# Patient Record
Sex: FEMALE | Race: OTHER | HISPANIC OR LATINO
[De-identification: names, ages, dates, MRNs, and addresses within clinical notes are randomized per-mention and may not be internally consistent; named-entity substitution may affect disease eponyms.]

---
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
## 2019-10-31 ENCOUNTER — APPOINTMENT (OUTPATIENT)
Dept: OBGYN | Facility: CLINIC | Age: 19
End: 2019-10-31
Payer: COMMERCIAL

## 2019-10-31 VITALS
BODY MASS INDEX: 26.12 KG/M2 | SYSTOLIC BLOOD PRESSURE: 110 MMHG | HEIGHT: 63.5 IN | DIASTOLIC BLOOD PRESSURE: 60 MMHG | WEIGHT: 149.25 LBS

## 2019-10-31 DIAGNOSIS — Z01.419 ENCOUNTER FOR GYNECOLOGICAL EXAMINATION (GENERAL) (ROUTINE) W/OUT ABNORMAL FINDINGS: ICD-10-CM

## 2019-10-31 LAB
HCG UR QL: POSITIVE
QUALITY CONTROL: YES

## 2019-10-31 PROCEDURE — 99395 PREV VISIT EST AGE 18-39: CPT

## 2019-10-31 NOTE — HISTORY OF PRESENT ILLNESS
[Normal Amount/Duration] : was of a normal amount and duration [Frequency: Q ___ days] : menstrual periods occur approximately every [unfilled] days [Menarche Age: ____] : age at menarche was [unfilled] [Sexually Active] : is sexually active [Spontaneous/Secondary] : is secondary/spontaneous [Pregnancy] : pregnant [Spotting Between  Menses] : no spotting between menses [Contraception] : does not use contraception

## 2019-11-01 LAB — HCG SERPL-MCNC: 7145 MIU/ML

## 2019-11-04 ENCOUNTER — RESULT REVIEW (OUTPATIENT)
Age: 19
End: 2019-11-04

## 2019-11-04 LAB
C TRACH RRNA SPEC QL NAA+PROBE: DETECTED
HCG SERPL-MCNC: 6334 MIU/ML
N GONORRHOEA RRNA SPEC QL NAA+PROBE: NOT DETECTED
SOURCE AMPLIFICATION: NORMAL

## 2019-11-05 ENCOUNTER — APPOINTMENT (OUTPATIENT)
Dept: ANTEPARTUM | Facility: CLINIC | Age: 19
End: 2019-11-05
Payer: COMMERCIAL

## 2019-11-05 ENCOUNTER — ASOB RESULT (OUTPATIENT)
Age: 19
End: 2019-11-05

## 2019-11-05 ENCOUNTER — APPOINTMENT (OUTPATIENT)
Dept: OBGYN | Facility: CLINIC | Age: 19
End: 2019-11-05
Payer: COMMERCIAL

## 2019-11-05 VITALS
DIASTOLIC BLOOD PRESSURE: 70 MMHG | HEIGHT: 63 IN | BODY MASS INDEX: 26.41 KG/M2 | SYSTOLIC BLOOD PRESSURE: 102 MMHG | WEIGHT: 149.06 LBS

## 2019-11-05 PROCEDURE — 99213 OFFICE O/P EST LOW 20 MIN: CPT

## 2019-11-05 PROCEDURE — 76817 TRANSVAGINAL US OBSTETRIC: CPT

## 2019-11-06 LAB
ABO + RH PNL BLD: NORMAL
BLD GP AB SCN SERPL QL: NORMAL
HCG SERPL-MCNC: 6126 MIU/ML

## 2019-11-07 ENCOUNTER — APPOINTMENT (OUTPATIENT)
Dept: OBGYN | Facility: CLINIC | Age: 19
End: 2019-11-07
Payer: COMMERCIAL

## 2019-11-07 PROCEDURE — 36415 COLL VENOUS BLD VENIPUNCTURE: CPT

## 2019-11-08 LAB — HCG SERPL-MCNC: 5049 MIU/ML

## 2019-11-14 ENCOUNTER — ASOB RESULT (OUTPATIENT)
Age: 19
End: 2019-11-14

## 2019-11-14 ENCOUNTER — TRANSCRIPTION ENCOUNTER (OUTPATIENT)
Age: 19
End: 2019-11-14

## 2019-11-14 ENCOUNTER — APPOINTMENT (OUTPATIENT)
Dept: OBGYN | Facility: CLINIC | Age: 19
End: 2019-11-14
Payer: COMMERCIAL

## 2019-11-14 ENCOUNTER — APPOINTMENT (OUTPATIENT)
Dept: ANTEPARTUM | Facility: CLINIC | Age: 19
End: 2019-11-14
Payer: COMMERCIAL

## 2019-11-14 VITALS
WEIGHT: 149 LBS | SYSTOLIC BLOOD PRESSURE: 100 MMHG | HEIGHT: 63 IN | DIASTOLIC BLOOD PRESSURE: 60 MMHG | BODY MASS INDEX: 26.4 KG/M2

## 2019-11-14 PROCEDURE — 76817 TRANSVAGINAL US OBSTETRIC: CPT

## 2019-11-14 PROCEDURE — 99214 OFFICE O/P EST MOD 30 MIN: CPT

## 2019-11-14 NOTE — PHYSICAL EXAM
[Normal] : cervix [Dilated] : the cervix was not dilated [Uterine Adnexae] : were not tender and not enlarged [No Bleeding] : there was no active vaginal bleeding

## 2019-11-15 ENCOUNTER — OUTPATIENT (OUTPATIENT)
Dept: OUTPATIENT SERVICES | Facility: HOSPITAL | Age: 19
LOS: 1 days | End: 2019-11-15
Payer: COMMERCIAL

## 2019-11-15 ENCOUNTER — RESULT REVIEW (OUTPATIENT)
Age: 19
End: 2019-11-15

## 2019-11-15 ENCOUNTER — APPOINTMENT (OUTPATIENT)
Dept: OBGYN | Facility: HOSPITAL | Age: 19
End: 2019-11-15

## 2019-11-15 VITALS
RESPIRATION RATE: 16 BRPM | DIASTOLIC BLOOD PRESSURE: 70 MMHG | OXYGEN SATURATION: 100 % | HEART RATE: 69 BPM | SYSTOLIC BLOOD PRESSURE: 115 MMHG

## 2019-11-15 VITALS
WEIGHT: 147.27 LBS | DIASTOLIC BLOOD PRESSURE: 70 MMHG | HEIGHT: 63 IN | TEMPERATURE: 97 F | HEART RATE: 75 BPM | RESPIRATION RATE: 16 BRPM | SYSTOLIC BLOOD PRESSURE: 110 MMHG

## 2019-11-15 VITALS
WEIGHT: 147.27 LBS | SYSTOLIC BLOOD PRESSURE: 121 MMHG | TEMPERATURE: 100 F | OXYGEN SATURATION: 100 % | HEIGHT: 63 IN | HEART RATE: 88 BPM | RESPIRATION RATE: 16 BRPM | DIASTOLIC BLOOD PRESSURE: 63 MMHG

## 2019-11-15 DIAGNOSIS — Z01.818 ENCOUNTER FOR OTHER PREPROCEDURAL EXAMINATION: ICD-10-CM

## 2019-11-15 DIAGNOSIS — Z98.890 OTHER SPECIFIED POSTPROCEDURAL STATES: Chronic | ICD-10-CM

## 2019-11-15 DIAGNOSIS — O02.1 MISSED ABORTION: ICD-10-CM

## 2019-11-15 DIAGNOSIS — Z29.9 ENCOUNTER FOR PROPHYLACTIC MEASURES, UNSPECIFIED: ICD-10-CM

## 2019-11-15 LAB
ANION GAP SERPL CALC-SCNC: 12 MMOL/L — SIGNIFICANT CHANGE UP (ref 5–17)
BLD GP AB SCN SERPL QL: SIGNIFICANT CHANGE UP
BUN SERPL-MCNC: 7 MG/DL — LOW (ref 8–20)
CALCIUM SERPL-MCNC: 9.6 MG/DL — SIGNIFICANT CHANGE UP (ref 8.6–10.2)
CHLORIDE SERPL-SCNC: 103 MMOL/L — SIGNIFICANT CHANGE UP (ref 98–107)
CO2 SERPL-SCNC: 21 MMOL/L — LOW (ref 22–29)
CREAT SERPL-MCNC: 0.55 MG/DL — SIGNIFICANT CHANGE UP (ref 0.5–1.3)
GLUCOSE SERPL-MCNC: 86 MG/DL — SIGNIFICANT CHANGE UP (ref 70–115)
HCG SERPL-ACNC: 8053 MIU/ML — HIGH
HCT VFR BLD CALC: 37.7 % — SIGNIFICANT CHANGE UP (ref 34.5–45)
HGB BLD-MCNC: 12.5 G/DL — SIGNIFICANT CHANGE UP (ref 11.5–15.5)
MCHC RBC-ENTMCNC: 29.9 PG — SIGNIFICANT CHANGE UP (ref 27–34)
MCHC RBC-ENTMCNC: 33.2 GM/DL — SIGNIFICANT CHANGE UP (ref 32–36)
MCV RBC AUTO: 90.2 FL — SIGNIFICANT CHANGE UP (ref 80–100)
PLATELET # BLD AUTO: 330 K/UL — SIGNIFICANT CHANGE UP (ref 150–400)
POTASSIUM SERPL-MCNC: 3.8 MMOL/L — SIGNIFICANT CHANGE UP (ref 3.5–5.3)
POTASSIUM SERPL-SCNC: 3.8 MMOL/L — SIGNIFICANT CHANGE UP (ref 3.5–5.3)
RBC # BLD: 4.18 M/UL — SIGNIFICANT CHANGE UP (ref 3.8–5.2)
RBC # FLD: 13.4 % — SIGNIFICANT CHANGE UP (ref 10.3–14.5)
SODIUM SERPL-SCNC: 136 MMOL/L — SIGNIFICANT CHANGE UP (ref 135–145)
WBC # BLD: 10 K/UL — SIGNIFICANT CHANGE UP (ref 3.8–10.5)
WBC # FLD AUTO: 10 K/UL — SIGNIFICANT CHANGE UP (ref 3.8–10.5)

## 2019-11-15 PROCEDURE — 59820 CARE OF MISCARRIAGE: CPT

## 2019-11-15 PROCEDURE — 85027 COMPLETE CBC AUTOMATED: CPT

## 2019-11-15 PROCEDURE — 88305 TISSUE EXAM BY PATHOLOGIST: CPT | Mod: 26

## 2019-11-15 PROCEDURE — 36415 COLL VENOUS BLD VENIPUNCTURE: CPT

## 2019-11-15 PROCEDURE — G0463: CPT

## 2019-11-15 PROCEDURE — 84702 CHORIONIC GONADOTROPIN TEST: CPT

## 2019-11-15 PROCEDURE — 86901 BLOOD TYPING SEROLOGIC RH(D): CPT

## 2019-11-15 PROCEDURE — 88305 TISSUE EXAM BY PATHOLOGIST: CPT

## 2019-11-15 PROCEDURE — 80048 BASIC METABOLIC PNL TOTAL CA: CPT

## 2019-11-15 PROCEDURE — 86850 RBC ANTIBODY SCREEN: CPT

## 2019-11-15 PROCEDURE — 86900 BLOOD TYPING SEROLOGIC ABO: CPT

## 2019-11-15 RX ORDER — HYDROMORPHONE HYDROCHLORIDE 2 MG/ML
1 INJECTION INTRAMUSCULAR; INTRAVENOUS; SUBCUTANEOUS
Refills: 0 | Status: DISCONTINUED | OUTPATIENT
Start: 2019-11-15 | End: 2019-11-15

## 2019-11-15 RX ORDER — FENTANYL CITRATE 50 UG/ML
25 INJECTION INTRAVENOUS
Refills: 0 | Status: DISCONTINUED | OUTPATIENT
Start: 2019-11-15 | End: 2019-11-15

## 2019-11-15 RX ORDER — IBUPROFEN 200 MG
1 TABLET ORAL
Qty: 28 | Refills: 0
Start: 2019-11-15 | End: 2019-11-21

## 2019-11-15 RX ORDER — ONDANSETRON 8 MG/1
4 TABLET, FILM COATED ORAL ONCE
Refills: 0 | Status: DISCONTINUED | OUTPATIENT
Start: 2019-11-15 | End: 2019-11-15

## 2019-11-15 RX ORDER — SODIUM CHLORIDE 9 MG/ML
3 INJECTION INTRAMUSCULAR; INTRAVENOUS; SUBCUTANEOUS EVERY 8 HOURS
Refills: 0 | Status: DISCONTINUED | OUTPATIENT
Start: 2019-11-15 | End: 2019-11-30

## 2019-11-15 RX ORDER — SODIUM CHLORIDE 9 MG/ML
1000 INJECTION, SOLUTION INTRAVENOUS
Refills: 0 | Status: DISCONTINUED | OUTPATIENT
Start: 2019-11-15 | End: 2019-11-15

## 2019-11-15 RX ORDER — DEXAMETHASONE 0.5 MG/5ML
8 ELIXIR ORAL ONCE
Refills: 0 | Status: DISCONTINUED | OUTPATIENT
Start: 2019-11-15 | End: 2019-11-15

## 2019-11-15 NOTE — H&P PST ADULT - NSANTHOSAYNRD_GEN_A_CORE
No. DIONNA screening performed.  STOP BANG Legend: 0-2 = LOW Risk; 3-4 = INTERMEDIATE Risk; 5-8 = HIGH Risk

## 2019-11-15 NOTE — H&P PST ADULT - ASSESSMENT
TAII VTE 2.0 SCORE [CLOT updated 2019]    AGE RELATED RISK FACTORS                                                       MOBILITY RELATED FACTORS  [ ] Age 41-60 years                                            (1 Point)                    [ ] Bed rest                                                        (1 Point)  [ ] Age: 61-74 years                                           (2 Points)                  [ ] Plaster cast                                                   (2 Points)  [ ] Age= 75 years                                              (3 Points)                    [ ] Bed bound for more than 72 hours                 (2 Points)    DISEASE RELATED RISK FACTORS                                               GENDER SPECIFIC FACTORS  [ ] Edema in the lower extremities                       (1 Point)              [ ] Pregnancy                                                     (1 Point)  [ ] Varicose veins                                               (1 Point)                     [ ] Post-partum < 6 weeks                                   (1 Point)             [ ] BMI > 25 Kg/m2                                            (1 Point)                     [ ] Hormonal therapy  or oral contraception          (1 Point)                 [ ] Sepsis (in the previous month)                        (1 Point)               [ ] History of pregnancy complications                 (1 point)  [ ] Pneumonia or serious lung disease                                               [ ] Unexplained or recurrent                     (1 Point)           (in the previous month)                               (1 Point)  [ ] Abnormal pulmonary function test                     (1 Point)                 SURGERY RELATED RISK FACTORS  [ ] Acute myocardial infarction                              (1 Point)               [ ]  Section                                             (1 Point)  [ ] Congestive heart failure (in the previous month)  (1 Point)      [ ] Minor surgery                                                  (1 Point)   [ ] Inflammatory bowel disease                             (1 Point)               [ ] Arthroscopic surgery                                        (2 Points)  [ ] Central venous access                                      (2 Points)                [ ] General surgery lasting more than 45 minutes (2 points)  [ ] Malignancy- Present or previous                   (2 Points)                [ ] Elective arthroplasty                                         (5 points)    [ ] Stroke (in the previous month)                          (5 Points)                                                                                                                                                           HEMATOLOGY RELATED FACTORS                                                 TRAUMA RELATED RISK FACTORS  [ ] Prior episodes of VTE                                     (3 Points)                [ ] Fracture of the hip, pelvis, or leg                       (5 Points)  [ ] Positive family history for VTE                         (3 Points)             [ ] Acute spinal cord injury (in the previous month)  (5 Points)  [ ] Prothrombin 02593 A                                     (3 Points)               [ ] Paralysis  (less than 1 month)                             (5 Points)  [ ] Factor V Leiden                                             (3 Points)                  [ ] Multiple Trauma within 1 month                        (5 Points)  [ ] Lupus anticoagulants                                     (3 Points)                                                           [ ] Anticardiolipin antibodies                               (3 Points)                                                       [ ] High homocysteine in the blood                      (3 Points)                                             [ ] Other congenital or acquired thrombophilia      (3 Points)                                                [ ] Heparin induced thrombocytopenia                  (3 Points)                                     Total Score [          ] CAPRINI VTE 2.0 SCORE [CLOT updated 2019]    AGE RELATED RISK FACTORS                                                       MOBILITY RELATED FACTORS  [ ] Age 41-60 years                                            (1 Point)                    [ ] Bed rest                                                        (1 Point)  [ ] Age: 61-74 years                                           (2 Points)                  [ ] Plaster cast                                                   (2 Points)  [ ] Age= 75 years                                              (3 Points)                    [ ] Bed bound for more than 72 hours                 (2 Points)    DISEASE RELATED RISK FACTORS                                               GENDER SPECIFIC FACTORS  [ ] Edema in the lower extremities                       (1 Point)              [x ] Pregnancy                                                     (1 Point)  [ ] Varicose veins                                               (1 Point)                     [ ] Post-partum < 6 weeks                                   (1 Point)             [ x] BMI > 25 Kg/m2                                            (1 Point)                     [ ] Hormonal therapy  or oral contraception          (1 Point)                 [ ] Sepsis (in the previous month)                        (1 Point)               [ ] History of pregnancy complications                 (1 point)  [ ] Pneumonia or serious lung disease                                               [ ] Unexplained or recurrent                     (1 Point)           (in the previous month)                               (1 Point)  [ ] Abnormal pulmonary function test                     (1 Point)                 SURGERY RELATED RISK FACTORS  [ ] Acute myocardial infarction                              (1 Point)               [ ]  Section                                             (1 Point)  [ ] Congestive heart failure (in the previous month)  (1 Point)      [x ] Minor surgery                                                  (1 Point)   [ ] Inflammatory bowel disease                             (1 Point)               [ ] Arthroscopic surgery                                        (2 Points)  [ ] Central venous access                                      (2 Points)                [ ] General surgery lasting more than 45 minutes (2 points)  [ ] Malignancy- Present or previous                   (2 Points)                [ ] Elective arthroplasty                                         (5 points)    [ ] Stroke (in the previous month)                          (5 Points)                                                                                                                                                           HEMATOLOGY RELATED FACTORS                                                 TRAUMA RELATED RISK FACTORS  [ ] Prior episodes of VTE                                     (3 Points)                [ ] Fracture of the hip, pelvis, or leg                       (5 Points)  [ ] Positive family history for VTE                         (3 Points)             [ ] Acute spinal cord injury (in the previous month)  (5 Points)  [ ] Prothrombin 55049 A                                     (3 Points)               [ ] Paralysis  (less than 1 month)                             (5 Points)  [ ] Factor V Leiden                                             (3 Points)                  [ ] Multiple Trauma within 1 month                        (5 Points)  [ ] Lupus anticoagulants                                     (3 Points)                                                           [ ] Anticardiolipin antibodies                               (3 Points)                                                       [ ] High homocysteine in the blood                      (3 Points)                                             [ ] Other congenital or acquired thrombophilia      (3 Points)                                                [ ] Heparin induced thrombocytopenia                  (3 Points)                                     Total Score [  3        ]  OPIOID RISK TOOL    ZEESHAN EACH BOX THAT APPLIES AND ADD TOTALS AT THE END    FAMILY HISTORY OF SUBSTANCE ABUSE                 FEMALE         MALE                                                Alcohol                             [  ]1 pt          [  ]3pts                                               Illegal Drugs                     [  ]2 pts        [  ]3pts                                               Rx Drugs                           [  ]4 pts        [  ]4 pts    PERSONAL HISTORY OF SUBSTANCE ABUSE                                                                                          Alcohol                             [  ]3 pts       [  ]3 pts                                               Illegal Drugs                     [  ]4 pts        [  ]4 pts                                               Rx Drugs                           [  ]5 pts        [  ]5 pts    AGE BETWEEN 16-45 YEARS                                      [ x ]1 pt         [  ]1 pt    HISTORY OF PREADOLESCENT   SEXUAL ABUSE                                                             [  ]3 pts        [  ]0pts    PSYCHOLOGICAL DISEASE                     ADD, OCD, Bipolar, Schizophrenia        [  ]2 pts         [  ]2 pts                      Depression                                               [  ]1 pt           [  ]1 pt           SCORING TOTAL   (add numbers and type here)              (1 )                                     A score of 3 or lower indicated LOW risk for future opioid abuse  A score of 4 to 7 indicated moderate risk for future opioid abuse  A score of 8 or higher indicates a high risk for opioid abuse

## 2019-11-15 NOTE — H&P PST ADULT - NSICDXPASTSURGICALHX_GEN_ALL_CORE_FT
PAST SURGICAL HISTORY:  No significant past surgical history PAST SURGICAL HISTORY:  H/O ovarian cystectomy

## 2019-11-15 NOTE — ASU DISCHARGE PLAN (ADULT/PEDIATRIC) - ACTIVITY LEVEL
No douching/No intercourse/Weight bearing as tolerated/Nothing per rectum/No tampons/Nothing per vagina/No tub baths

## 2019-11-15 NOTE — BRIEF OPERATIVE NOTE - NSICDXBRIEFPROCEDURE_GEN_ALL_CORE_FT
PROCEDURES:  Dilation and curettage of uterus using suction for missed  in first trimester 15-Nov-2019 14:12:38  Ban Coreas

## 2019-11-15 NOTE — ASU PREOP CHECKLIST - TEMPERATURE IN CELSIUS (DEGREES C)
37.5 POA. Likely 2/2 hypercoagulable state in the setting of active untreated malignancy.  -c/w heparin ggt, could consider doac; with further discussion w/ surgery- not planning to continue anticoagulation on discharge. POA. Likely 2/2 hypercoagulable state in the setting of active untreated malignancy.  -c/w heparin ggt, could consider doac-- patient was on edoxaban previously; with further discussion w/ surgery- not planning to continue anticoagulation on discharge.

## 2019-11-15 NOTE — H&P PST ADULT - NSICDXPROBLEM_GEN_ALL_CORE_FT
PROBLEM DIAGNOSES  Problem: Need for prophylactic measure  Assessment and Plan: Caprini Score 3 Moderate Risk, surgical team should consider VTE prophylaxis     Problem: Missed   Assessment and Plan: D&C with suction.

## 2019-11-15 NOTE — H&P PST ADULT - HISTORY OF PRESENT ILLNESS
19 year old female 19 year old female with missed  scheduled for a D&C she states she is around 6 weeks of gestation and  started spotting last week

## 2019-11-15 NOTE — ASU DISCHARGE PLAN (ADULT/PEDIATRIC) - CARE PROVIDER_API CALL
Steven Thomson (DO)  Obstetrics and Gynecology  [address], [address] [place] [address]  Phone: [phone]  Fax: [phone]  Follow Up Time: 1 week

## 2019-11-15 NOTE — ASU DISCHARGE PLAN (ADULT/PEDIATRIC) - CALL YOUR DOCTOR IF YOU HAVE ANY OF THE FOLLOWING:
Numbness, tingling, color or temperature change to extremity/Bleeding that does not stop/Swelling that gets worse/Wound/Surgical Site with redness, or foul smelling discharge or pus/Unable to urinate

## 2019-11-15 NOTE — ASU DISCHARGE PLAN (ADULT/PEDIATRIC) - PAIN MANAGEMENT
Take over the counter pain medication Prescriptions electronically submitted to pharmacy from [facility]

## 2019-11-20 ENCOUNTER — APPOINTMENT (OUTPATIENT)
Dept: ANTEPARTUM | Facility: HOSPITAL | Age: 19
End: 2019-11-20

## 2019-11-21 LAB — SURGICAL PATHOLOGY STUDY: SIGNIFICANT CHANGE UP

## 2020-03-01 ENCOUNTER — TRANSCRIPTION ENCOUNTER (OUTPATIENT)
Age: 20
End: 2020-03-01

## 2020-06-10 ENCOUNTER — EMERGENCY (EMERGENCY)
Facility: HOSPITAL | Age: 20
LOS: 1 days | Discharge: HOME | End: 2020-06-10
Attending: EMERGENCY MEDICINE
Payer: COMMERCIAL

## 2020-06-10 VITALS
TEMPERATURE: 98 F | DIASTOLIC BLOOD PRESSURE: 85 MMHG | RESPIRATION RATE: 20 BRPM | OXYGEN SATURATION: 97 % | SYSTOLIC BLOOD PRESSURE: 123 MMHG | HEART RATE: 97 BPM

## 2020-06-10 VITALS
SYSTOLIC BLOOD PRESSURE: 110 MMHG | TEMPERATURE: 98 F | HEART RATE: 74 BPM | OXYGEN SATURATION: 99 % | RESPIRATION RATE: 18 BRPM | DIASTOLIC BLOOD PRESSURE: 62 MMHG

## 2020-06-10 DIAGNOSIS — Z98.890 OTHER SPECIFIED POSTPROCEDURAL STATES: Chronic | ICD-10-CM

## 2020-06-10 DIAGNOSIS — F32.9 MAJOR DEPRESSIVE DISORDER, SINGLE EPISODE, UNSPECIFIED: ICD-10-CM

## 2020-06-10 LAB
AMPHET UR-MCNC: NEGATIVE — SIGNIFICANT CHANGE UP
APAP SERPL-MCNC: <7.5 UG/ML — LOW (ref 10–26)
BARBITURATES UR SCN-MCNC: NEGATIVE — SIGNIFICANT CHANGE UP
BASOPHILS # BLD AUTO: 0.05 K/UL — SIGNIFICANT CHANGE UP (ref 0–0.2)
BASOPHILS NFR BLD AUTO: 0.6 % — SIGNIFICANT CHANGE UP (ref 0–2)
BENZODIAZ UR-MCNC: NEGATIVE — SIGNIFICANT CHANGE UP
COCAINE METAB.OTHER UR-MCNC: NEGATIVE — SIGNIFICANT CHANGE UP
EOSINOPHIL # BLD AUTO: 0.03 K/UL — SIGNIFICANT CHANGE UP (ref 0–0.5)
EOSINOPHIL NFR BLD AUTO: 0.3 % — SIGNIFICANT CHANGE UP (ref 0–6)
ETHANOL SERPL-MCNC: <10 MG/DL — SIGNIFICANT CHANGE UP
HCG SERPL-ACNC: <4 MIU/ML — SIGNIFICANT CHANGE UP
HCT VFR BLD CALC: 36.7 % — SIGNIFICANT CHANGE UP (ref 34.5–45)
HGB BLD-MCNC: 12.3 G/DL — SIGNIFICANT CHANGE UP (ref 11.5–15.5)
IMM GRANULOCYTES NFR BLD AUTO: 0.2 % — SIGNIFICANT CHANGE UP (ref 0–1.5)
LYMPHOCYTES # BLD AUTO: 2.29 K/UL — SIGNIFICANT CHANGE UP (ref 1–3.3)
LYMPHOCYTES # BLD AUTO: 26.3 % — SIGNIFICANT CHANGE UP (ref 13–44)
MCHC RBC-ENTMCNC: 29.4 PG — SIGNIFICANT CHANGE UP (ref 27–34)
MCHC RBC-ENTMCNC: 33.5 GM/DL — SIGNIFICANT CHANGE UP (ref 32–36)
MCV RBC AUTO: 87.8 FL — SIGNIFICANT CHANGE UP (ref 80–100)
METHADONE UR-MCNC: NEGATIVE — SIGNIFICANT CHANGE UP
MONOCYTES # BLD AUTO: 0.51 K/UL — SIGNIFICANT CHANGE UP (ref 0–0.9)
MONOCYTES NFR BLD AUTO: 5.9 % — SIGNIFICANT CHANGE UP (ref 2–14)
NEUTROPHILS # BLD AUTO: 5.8 K/UL — SIGNIFICANT CHANGE UP (ref 1.8–7.4)
NEUTROPHILS NFR BLD AUTO: 66.7 % — SIGNIFICANT CHANGE UP (ref 43–77)
OPIATES UR-MCNC: NEGATIVE — SIGNIFICANT CHANGE UP
PCP SPEC-MCNC: SIGNIFICANT CHANGE UP
PCP UR-MCNC: NEGATIVE — SIGNIFICANT CHANGE UP
PLATELET # BLD AUTO: 327 K/UL — SIGNIFICANT CHANGE UP (ref 150–400)
RBC # BLD: 4.18 M/UL — SIGNIFICANT CHANGE UP (ref 3.8–5.2)
RBC # FLD: 12.8 % — SIGNIFICANT CHANGE UP (ref 10.3–14.5)
SALICYLATES SERPL-MCNC: <0.6 MG/DL — LOW (ref 10–20)
THC UR QL: NEGATIVE — SIGNIFICANT CHANGE UP
WBC # BLD: 8.7 K/UL — SIGNIFICANT CHANGE UP (ref 3.8–10.5)
WBC # FLD AUTO: 8.7 K/UL — SIGNIFICANT CHANGE UP (ref 3.8–10.5)

## 2020-06-10 PROCEDURE — 36415 COLL VENOUS BLD VENIPUNCTURE: CPT

## 2020-06-10 PROCEDURE — 93010 ELECTROCARDIOGRAM REPORT: CPT

## 2020-06-10 PROCEDURE — 80307 DRUG TEST PRSMV CHEM ANLYZR: CPT

## 2020-06-10 PROCEDURE — 90792 PSYCH DIAG EVAL W/MED SRVCS: CPT | Mod: 95

## 2020-06-10 PROCEDURE — 84702 CHORIONIC GONADOTROPIN TEST: CPT

## 2020-06-10 PROCEDURE — 99213 OFFICE O/P EST LOW 20 MIN: CPT

## 2020-06-10 PROCEDURE — 80053 COMPREHEN METABOLIC PANEL: CPT

## 2020-06-10 PROCEDURE — 93005 ELECTROCARDIOGRAM TRACING: CPT

## 2020-06-10 PROCEDURE — 99285 EMERGENCY DEPT VISIT HI MDM: CPT

## 2020-06-10 PROCEDURE — 85027 COMPLETE CBC AUTOMATED: CPT

## 2020-06-10 RX ORDER — SERTRALINE 25 MG/1
1 TABLET, FILM COATED ORAL
Qty: 30 | Refills: 0
Start: 2020-06-10 | End: 2020-07-09

## 2020-06-10 NOTE — ED BEHAVIORAL HEALTH ASSESSMENT NOTE - SUMMARY
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  Pt s/p SA, endorsing multiple depressive symptoms, not engaged in outpatient treatment but currently denies SI and is not interested in voluntary admission.  Will hold pt in ED for further observation, reassessment, and collateral to determine if safe dispo plan can be made.  Pt aware of current safety concerns and possibility of involuntary admission.  Pt's mother not answering at this time and no option to leave VM. 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  Pt s/p SA, endorsing multiple depressive symptoms, not engaged in outpatient treatment but currently denies SI and is not interested in voluntary admission.  Pt initially requesting to speak without 1:1 staff present (though informed of and accepting of telepsych protocol) and there was a brief latency when pt responding to screening question about trauma so pt possibly guarded with writer.  Will hold pt in ED for further observation, reassessment, and collateral to determine if safe dispo plan can be made.  Pt aware of current safety concerns and possibility of involuntary admission.  Pt's mother not answering at this time and no option to leave VM.

## 2020-06-10 NOTE — ED BEHAVIORAL HEALTH ASSESSMENT NOTE - DETAILS
s/p SA; pt reports hx of SIB by burning self 6 months ago sister - bipolar depression unable to reach pt's mother will discuss with ED provider

## 2020-06-10 NOTE — ED PROVIDER NOTE - PROGRESS NOTE DETAILS
pt medical stable , awaiting psych recs Kareem: Pt evaluated by psychiatry, felt to be low-moderate risk for suicide but attempt had low lethality risk and pt has strong social support so she is safe to be discharged in the care of her mother. Seen by social work who arranged follow up with [facility].

## 2020-06-10 NOTE — ED ADULT TRIAGE NOTE - CHIEF COMPLAINT QUOTE
Reports taking 6advil pm to self harm, denies any previous attempts, hysterical in triage, mother in triage with pt denies any hx of bizarre behavior or psych problems, denies HI

## 2020-06-10 NOTE — ED BEHAVIORAL HEALTH ASSESSMENT NOTE - REASON
pending further observation, reassessment, and collateral to determine if safe dispo can be made vs involuntary admission

## 2020-06-10 NOTE — ED BEHAVIORAL HEALTH NOTE - BEHAVIORAL HEALTH NOTE
PROGRESS NOTE: 06-10-20 @ 11:06  	  • Reason for Ongoing Consultation: suicide attempt	    ID: 19 Female with HEALTH ISSUES - PROBLEM Dx:  Major depressive episode      INTERVAL DATA:   • Interval Chief Complaint: "I wanted to kill myself"  • Interval History: Pt is a 18YO F with no pmh brought in due to a suicide attempt. The patients states that she took about 5 Advil PM tablets in order to kill herself. She denies that anything specific triggered her to do this and denies having any issues with her boyfriend or with family. Pt admits to feelings of sadness, anxiety, and difficulty sleeping. The patient states that she has also had a decreased appetite and lost about 10 pounds in a few weeks. She repeatedly expresses that she does not want to be admitted to a hospital and feels that she would be safe to go home if she could follow-up with outpatient therapy and start medication. Patient denies currently having suicidal ideations.     Spoke to patient's mother who reports that this is the first time the patient has ever attempted to end her life and that this is all new to her. She does not know if this was a cry for help or if her daughter may have an underlying psychiatric issue. She does not identify any triggering event leading to the incident, but suspects that it may have something to do with her boyfriend as she had just come from his house and has been spending a lot of time with him. The patient's mother does not object to the patient returning home, but feels that she does need some kind of help.    REVIEW OF SYSTEMS:   • Constitutional Symptoms	No complaints  • Eyes	No complaints  • Ears / Nose / Throat / Mouth	No complaints  • Cardiovascular	No complaints  • Respiratory	No complaints  • Gastrointestinal	No complaints  • Genitourinary	No complaints  • Musculoskeletal	No complaints  • Skin	No complaints  • Neurological	No complaints  • Psychiatric (see HPI)	See HPI  • Endocrine	No complaints  • Hematologic / Lymphatic	No complaints  • Allergic / Immunologic	No complaints    REVIEW OF VITALS/LABS/IMAGING/INVESTIGATIONS:   • Vital signs reviewed: Yes  • Vital Signs:	    T(C): 36.4 (06-10-20 @ 11:03), Max: 37 (06-10-20 @ 07:30)  HR: 64 (06-10-20 @ 11:03) (56 - 97)  BP: 104/71 (06-10-20 @ 11:03) (104/71 - 123/85)  RR: 18 (06-10-20 @ 11:03) (18 - 20)  SpO2: 100% (06-10-20 @ 11:03) (97% - 100%)    • Available labs reviewed: Yes  • Available Lab Results:                           12.3   8.70  )-----------( 327      ( 10 [place]ot[place] 2020 00:57 )             36.7     06-10    138  |  103  |  4.0<L>  ----------------------------<  90  3.6   |  17.0<L>  |  0.64    Ca    10.1      10 [place]ot[place] 2020 00:57    TPro  8.4  /  Alb  5.2  /  TBili  0.3<L>  /  DBili  x   /  AST  16  /  ALT  9   /  AlkPhos  64  06-10    LIVER FUNCTIONS - ( 10 Dakotah 2020 00:57 )  Alb: 5.2 g/dL / Pro: 8.4 g/dL / ALK PHOS: 64 U/L / ALT: 9 U/L / AST: 16 U/L / GGT: x             MEDICATIONS:      PRN Medications:  • PRN Medications since last evaluation	  • PRN Details	    Current Medications: none     Medication Side Effects:  • Medication Side Effects or Adverse Reactions (new or ongoing)	None known    MENTAL STATUS EXAM:   • Level of Consciousness	Alert  • General Appearance	Well developed  • Body Habitus	Well nourished  • Hygiene	Good  • Grooming	Good  • Behavior	Cooperative  • Eye Contact	Good  • Relatedness	Good  • Impulse Control	Normal  • Muscle Tone / Strength	Normal muscle tone/strength  • Abnormal Movements	No abnormal movements  • Gait / Station	Normal gait / station  • Speech Volume    soft  • Speech Rate	slowed  • Speech Spontaneity	Normal  • Speech Articulation	Normal  • Mood	depressed  • Affect Quality	Euthymic  • Affect Range	blunted  • Affect Congruence	Congruent  • Thought Process	Linear  • Thought Associations	Normal  • Thought Content	Unremarkable  • Perceptions	No abnormalities  • Oriented to Time	Yes  • Oriented to Place	Yes  • Oriented to Situation	Yes  • Oriented to Person	Yes  • Attention / Concentration	Normal  • Estimated Intelligence	Average  • Recent Memory	Normal  • Remote Memory	Normal  • Fund of Knowledge	Normal  • Language	No abnormalities noted  • Judgment (regarding everyday events)	Fair  • Insight (regarding psychiatric illness)	Fair    SUICIDALITY:   • Suicidality (Interval)	none known    HOMICIDALITY/AGGRESSION:   • Homicidality/Aggression	none known    DIAGNOSIS DSM-V:    Psychiatric Diagnosis (Corresponds to DSM-IV Axis I, II):    HEALTH ISSUES - PROBLEM Dx:  Major depressive episode       Medical Diagnosis (Corresponds to DSM-IV Axis III): none  • Axis III	      ASSESSMENT OF CURRENT CONDITION:   Summary (include case differential, formulation and patient response to therapy): Pt is a 20 YO F with no pmh p/w a suicidal attempt however lethality level was very low.. She is also c/o recent feelings of sadness, anxiety, lack of sleep and decreased appetite. Pt denies any triggering event or reasoning for why she took 5 Advil PM tablets. Pt denies that she is currently suicidal and does not want to be admitted to an inpatient facility. Patient and patient's mother agree that the patient would be safe to return home, but would benefit from outpatient therapy and possibly medication.    Risk Assessment (consider static vs modifiable risk factors and protective factors; comment on level of risk for dangerous behavior): moderate risk for suicide act was impulsive seems motivated for help family supportive    PLAN  Start Sertraline 25mg QD  Refer for outpatient therapy [facility]

## 2020-06-10 NOTE — ED BEHAVIORAL HEALTH NOTE - BEHAVIORAL HEALTH NOTE
===================  PRE-HOSPITAL COURSE  ===================  SOURCE:  [facility] Di[facility]/ ED documentation   DETAILS:  BIB mother s/p SA by ingestion of 6 AdvilPM    ============  ED COURSE   ============  SOURCE:  [facility]/ ED documentation   ARRIVAL:  accompanied by mother  BELONGINGS:  -  BEHAVIOR: Pt. has been calm and cooperative throughout, complied with triage processes willingly.  Pt. maintains poor to no eye contact, speech is of normal rate and volume, thoughts are logical and linear in nature.  However pt. expressing SI in ED, crying intermittently.  Pt's mood described as depressed/anxious with blunt affect. Pt. interacting appropriately with ED staff and mother, resting comfortably awaiting evaluation.  TREATMENT:  no medications given, no security intervention required   VISITORS: -

## 2020-06-10 NOTE — ED BEHAVIORAL HEALTH ASSESSMENT NOTE - HPI (INCLUDE ILLNESS QUALITY, SEVERITY, DURATION, TIMING, CONTEXT, MODIFYING FACTORS, ASSOCIATED SIGNS AND SYMPTOMS)
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  Pt states that she took pills because she was feeling very upset without identifiable trigger.  Pt states she "didn't feel right" and took the pills with hopes that she wouldn't wake up.  Pt states that she woke up that morning feeling sad and crying but spent the remainder of the day hanging out with friends and boyfriend.  She denies any issues with friends or family.  She states she had been thinking about taking the pills all day but later states that she just took them in the moment.  Pt reports afterwards she felt anxious, shaking, couldn't breathe, hot, almost fainted.  She told her mother she wasn't feeling right so her mother brought her to the ED.  Pt states that she has intermittent SI, every "once in a while," clarified to be approx every 5 days, but that she usually never goes through with it.  Pt reports mood lability, fluctuating from happy to upset to angry.  She denies current SI but feels tired.  She reports her mood has generally been sad, with sleep disturbance (staying up until 5am, waking up at 2-3pm), anhedonia, feeling tired, decreased appetite with associated weight loss (10 lbs in past 2 weeks).  Pt denies AVH.  When asked about manic symptoms, pt states that she has them sometimes but clarifies that she feels "jittery" sometimes.  Pt reports vaping sometimes and drinking alcohol on the weekends.  Pt is not interested in voluntary admission at this time given that her sister had a past admission and she feels that her sister was worse afterwards.  She feels that her mother will be able to take care of her at home though her mother works. 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  Pt states that she took pills because she was feeling very upset without identifiable trigger.  Pt states she "didn't feel right" and took the pills with hopes that she wouldn't wake up.  Pt states that she woke up that morning feeling sad and crying but spent the remainder of the day hanging out with friends and boyfriend.  She denies any issues with friends or family.  She states she had been thinking about taking the pills all day but later states that she just took them in the moment.  Pt reports afterwards she felt anxious, shaking, couldn't breathe, hot, almost fainted.  She told her mother she wasn't feeling right so her mother brought her to the ED.  Pt states that she has intermittent SI, every "once in a while," clarified to be approx every 5 days, but that she usually never goes through with it.  Pt reports mood lability, fluctuating from happy to upset to angry.  She denies current SI but feels tired.  She reports her mood has generally been sad, with sleep disturbance (staying up until 5am, waking up at 2-3pm), anhedonia, feeling tired, decreased appetite with associated weight loss (10 lbs in past 2 weeks).  Pt denies AVH.  When asked about manic symptoms, pt states that she has them sometimes but clarifies that she feels "jittery" sometimes.  Pt reports vaping sometimes and drinking alcohol on the weekends.  Pt is not interested in voluntary admission at this time given that her sister had a past admission and she feels that her sister was worse afterwards.  She feels that her mother will be able to take care of her at home though her mother works.    Writer attempted to reach pt's mother ([phone]) though no answer and no option to leave VM

## 2020-06-10 NOTE — ED PROVIDER NOTE - ATTENDING CONTRIBUTION TO CARE
Statement Selected
I personally saw the patient with the PA, and completed the key components of the history and physical exam. I then discussed the management plan with the PA.   gen in nad resp clear cardiac no murmur abd soft nt nd neuro no deficits psych + SI   agree with pa plan of care

## 2020-06-10 NOTE — ED BEHAVIORAL HEALTH ASSESSMENT NOTE - DESCRIPTION
see BH note denies lives with family, hx of some nursing school, previously worked in a [employer]

## 2020-06-10 NOTE — ED PROVIDER NOTE - NSFOLLOWUPINSTRUCTIONS_ED_ALL_ED_FT
Continue medications as prescribed.  Follow up with [facility] at your arranged appointment on June 16, 2020 at 1pm.  Return to the ER immediately if you have thoughts of depression, suicide or any other new symptoms.

## 2020-06-10 NOTE — ED BEHAVIORAL HEALTH ASSESSMENT NOTE - RISK ASSESSMENT
risk factors: s/p SA, not engaged in treatment, hx SIB  protective factors: supportive family, denies current SI, denies access to guns/weapons, denies trauma hx Moderate Acute Suicide Risk

## 2020-06-10 NOTE — ED PROVIDER NOTE - PATIENT PORTAL LINK FT
You can access the FollowMyHealth Patient Portal offered by [facility] by registering at the following website: [email or web address]. By joining [employer]’s FollowMyHealth portal, you will also be able to view your health information using other applications (apps) compatible with our system.

## 2020-06-10 NOTE — CHART NOTE - NSCHARTNOTEFT_GEN_A_CORE
SOCIAL WORK NOTE:  THIS WORKER RECEIVED NOTIFICATION FROM  THAT PATIENT IS CLEARED FOR DISCHARGE AND WILL NEED AN FSL APPOINMENTS.  MET WITH PATIENT WHO WAS ALERT, ORIENTED AND IN AGREEMENT TO APPOINTMENT.  PATIENT SIGNED THE CONSENT FORM TO MAKE THE APPPOINTMENT AND EXPRESSED A PREFERENCE FOR AFTERNOON HOURS ANY DAY OF THE WEEK.  THIS WORKER MADE APPOINTMENT FOR THE PATIENT ON [employer] CALENDER FOR TUESDAY JUNE 16TH , 2020 AT 1:00 PM.  PRESENTED PATIENT WITH APPOINTMENT CARD AND AGREED TO GO ON THIS DATE.  MADE PATIENT AWARE SHE WAS CLEARED IBRAHIM DISCHARGE AND PATIENT CONFIRMED HER MOTHER WOULD BE ABLE TO PICK HER UP UPON DISHCARGE.

## 2020-07-13 ENCOUNTER — TRANSCRIPTION ENCOUNTER (OUTPATIENT)
Age: 20
End: 2020-07-13

## 2020-10-30 NOTE — ED ADULT NURSE NOTE - NSIMPLEMENTINTERV_GEN_ALL_ED
H
Implemented All Universal Safety Interventions:  [place] to call system. Call bell, personal items and telephone within reach. Instruct patient to call for assistance. Room bathroom lighting operational. Non-slip footwear when patient is off stretcher. Physically safe environment: no spills, clutter or unnecessary equipment. Stretcher in lowest position, wheels locked, appropriate side rails in place.

## 2020-12-21 PROBLEM — Z01.419 ENCOUNTER FOR GYNECOLOGICAL EXAMINATION: Status: RESOLVED | Noted: 2019-10-31 | Resolved: 2020-12-21

## 2021-10-12 ENCOUNTER — APPOINTMENT (OUTPATIENT)
Dept: OBGYN | Facility: CLINIC | Age: 21
End: 2021-10-12
Payer: MEDICAID

## 2021-10-12 ENCOUNTER — LABORATORY RESULT (OUTPATIENT)
Age: 21
End: 2021-10-12

## 2021-10-12 VITALS
DIASTOLIC BLOOD PRESSURE: 70 MMHG | WEIGHT: 167 LBS | HEIGHT: 63 IN | BODY MASS INDEX: 29.59 KG/M2 | SYSTOLIC BLOOD PRESSURE: 118 MMHG

## 2021-10-12 DIAGNOSIS — Z01.419 ENCOUNTER FOR GYNECOLOGICAL EXAMINATION (GENERAL) (ROUTINE) W/OUT ABNORMAL FINDINGS: ICD-10-CM

## 2021-10-12 DIAGNOSIS — A74.9 CHLAMYDIAL INFECTION, UNSPECIFIED: ICD-10-CM

## 2021-10-12 PROCEDURE — 99395 PREV VISIT EST AGE 18-39: CPT

## 2021-10-12 RX ORDER — AZITHROMYCIN 500 MG/1
500 TABLET, FILM COATED ORAL
Qty: 2 | Refills: 1 | Status: DISCONTINUED | COMMUNITY
Start: 2019-11-04 | End: 2021-10-12

## 2021-10-12 NOTE — HISTORY OF PRESENT ILLNESS
[N] : Patient does not use contraception [Y] : Patient is sexually active [Frequency: Q ___ days] : menstrual periods occur approximately every [unfilled] days [Menarche Age: ____] : age at menarche was [unfilled] [PGxTotal] : 1 [PGHxPremature] : 0 [PGHxFullTerm] : 0 [PGHxAbortions] : 1 [[facility]] : 0 [PGHxABInduced] : 0 [PGHxABSpont] : 1 [PGHxEctopic] : 0 [PGHxMultBirths] : 0 [Regular Cycle Intervals] : periods have been regular

## 2021-10-13 LAB
C TRACH RRNA SPEC QL NAA+PROBE: NOT DETECTED
N GONORRHOEA RRNA SPEC QL NAA+PROBE: NOT DETECTED
SOURCE TP AMPLIFICATION: NORMAL

## 2021-10-20 LAB — CYTOLOGY CVX/VAG DOC THIN PREP: ABNORMAL

## 2021-10-22 ENCOUNTER — TRANSCRIPTION ENCOUNTER (OUTPATIENT)
Age: 21
End: 2021-10-22

## 2021-10-25 ENCOUNTER — EMERGENCY (EMERGENCY)
Facility: HOSPITAL | Age: 21
LOS: 1 days | Discharge: HOME | End: 2021-10-25
Attending: EMERGENCY MEDICINE
Payer: MEDICAID

## 2021-10-25 VITALS
TEMPERATURE: 98 F | HEIGHT: 63 IN | DIASTOLIC BLOOD PRESSURE: 72 MMHG | RESPIRATION RATE: 20 BRPM | OXYGEN SATURATION: 99 % | WEIGHT: 160.06 LBS | HEART RATE: 102 BPM | SYSTOLIC BLOOD PRESSURE: 118 MMHG

## 2021-10-25 DIAGNOSIS — Z98.890 OTHER SPECIFIED POSTPROCEDURAL STATES: Chronic | ICD-10-CM

## 2021-10-25 PROCEDURE — 99282 EMERGENCY DEPT VISIT SF MDM: CPT

## 2021-10-25 PROCEDURE — 99283 EMERGENCY DEPT VISIT LOW MDM: CPT
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
## 2021-10-25 NOTE — ED PROVIDER NOTE - CARE PROVIDER_API CALL
Stone Jenkins)  Ophthalmology  [address], [address]  [place] [address]  Phone: [phone]  Fax: [phone]  Follow Up Time: Urgent

## 2021-10-25 NOTE — ED PROVIDER NOTE - PATIENT PORTAL LINK FT
You can access the FollowMyHealth Patient Portal offered by [facility] by registering at the following website: [email or web address]. By joining [employer]’s FollowMyHealth portal, you will also be able to view your health information using other applications (apps) compatible with our system.

## 2021-10-25 NOTE — ED PROVIDER NOTE - CHIEF COMPLAINT
Pt just came back from [facility]. Pt b/p 91/49 oral temp 102.8 pt family refusing for her to have blood drawn after first attempt Pt febrile, temp 102.5 The patient is a 21y Female complaining of eye redness.

## 2021-10-25 NOTE — ED PROVIDER NOTE - CLINICAL SUMMARY MEDICAL DECISION MAKING FREE TEXT BOX
Pt with subconjunctival hematoma of right eye- no signs/symptoms of corneal abrasion, denies visual complaints, dc home with ophthalmology follow up. Amy Goldman DO

## 2021-10-25 NOTE — ED PROVIDER NOTE - PHYSICAL EXAMINATION
Gen: NAD, AOx3  Head: NCAT  HEENT: PERRL, EOMI, oral mucosa moist, subconjunctival hemmorhage noted R medial eye with limbic sparing, oropharynx clear without exudate or erythema  Lung: no respiratory distress  CV:  Normal perfusion  MSK: No edema, no visible deformities, full range of motion in all 4 extremities  Neuro: No focal neurologic deficits  Skin: No rash   Psych: normal affect

## 2021-10-25 NOTE — ED PROVIDER NOTE - NSFOLLOWUPINSTRUCTIONS_ED_ALL_ED_FT
1. Follow up with ophthalmology for reevaluation.  2.  Return to the Emergency Department for worsening, progressive or any other concerning symptoms.       Subconjunctival Hemorrhage    WHAT YOU NEED TO KNOW:    What is a subconjunctival hemorrhage? A subconjunctival hemorrhage is when blood collects under the conjunctiva in your eye. The conjunctiva is the clear lining that covers the white part of your eye. The blood comes from broken blood vessels under the conjunctiva.    What causes a subconjunctival hemorrhage? The exact cause of your subconjunctival hemorrhage may not be known. The following are common causes:   •An accident or injury to the eye      •Hard coughs or sneezes      •Medical conditions, such as high blood pressure, diabetes, or a bleeding disorder      •Strain, such as when you lift a heavy object or have a bowel movement      •Blood thinning medicines      •Vomiting      What are the signs and symptoms of a subconjunctival hemorrhage? The most common sign is a red patch on the white part of your eye. The redness may be present for 2 to 3 weeks. You may have mild pain when you move your eye.    How is a subconjunctival hemorrhage diagnosed? Your healthcare provider will examine your eye and check your vision. You may need tests to check for an underlying medical condition.    How is a subconjunctival hemorrhage treated? A subconjunctival hemorrhage usually goes away on its own. You may need any of the following to help manage your symptoms:   •Cold or warm compress: Use a cold pack during the first 24 hours. Ask how often to apply it and for how long each time. After the first 24 hours, apply a warm pack on your eye. Do this 3 times each day for about 10 to 15 minutes each time.      •Eyedrops: You may need artificial tears to keep your eye moist. Use the drops as directed.  Steps 1 2 3 4           When should I contact my healthcare provider?   •The redness in your eye has not gone away after 3 weeks.      •You have another subconjunctival hemorrhage.      •You have subconjunctival hemorrhages in both eyes.      •You have questions or concerns about your condition or care.      When should I seek immediate care?   •You have eye pain or sensitivity to light.      •Your vision changes.      •You have white or yellow discharge from your eye.      CARE AGREEMENT:    You have the right to help plan your care. Learn about your health condition and how it may be treated. Discuss treatment options with your healthcare providers to decide what care you want to receive. You always have the right to refuse treatment.

## 2021-10-25 NOTE — ED PROVIDER NOTE - CARE PROVIDERS DIRECT ADDRESSES
[email or web address]

## 2022-03-10 ENCOUNTER — APPOINTMENT (OUTPATIENT)
Dept: OBGYN | Facility: CLINIC | Age: 22
End: 2022-03-10
Payer: MEDICAID

## 2022-03-10 VITALS
HEIGHT: 63 IN | WEIGHT: 176.38 LBS | SYSTOLIC BLOOD PRESSURE: 110 MMHG | DIASTOLIC BLOOD PRESSURE: 66 MMHG | BODY MASS INDEX: 31.25 KG/M2

## 2022-03-10 LAB
HCG UR QL: NEGATIVE
QUALITY CONTROL: YES

## 2022-03-10 PROCEDURE — 81025 URINE PREGNANCY TEST: CPT

## 2022-03-10 PROCEDURE — 99213 OFFICE O/P EST LOW 20 MIN: CPT

## 2022-03-10 NOTE — PHYSICAL EXAM
[Chaperone Present] : A chaperone was present in the examining room during all aspects of the physical examination [Awake] : awake [Alert] : alert [Acute Distress] : no acute distress [Soft] : soft [Tender] : non tender [Oriented x3] : oriented to person, place, and time [Normal] : uterus [No Bleeding] : there was no active vaginal bleeding [Uterine Adnexae] : were not tender and not enlarged

## 2022-07-13 NOTE — ED PROVIDER NOTE - OBJECTIVE STATEMENT
Caller: Domingo Ruiz    Relationship: Self    Best call back number:    [phone]     What is the best time to reach you:     AFTERNOON    Who are you requesting to speak with (clinical staff, provider,  specific staff member):     SANGEETA MEDRANO    What was the call regarding:     PATIENT REQUESTED A CALL BACK FROM SANGEETA MEDRANO TO DISCUSS TEST RESULTS FURTHER    Do you require a callback:     YES       pt is a 18 y/o female with a pmhx of depression presenting to the ed after sucidial attempt. pt states for the past three days has felt depressed, no specific triggers. pt states tonight took 5 advil pm in attempt to kill herself. pt states in past when she was younger sucidial attempt - took pills wont specifiy. pt admits to drinking alcohol tonight. pt states lives with her mother feels safe at home. pt denies other drug use. pt denies cp, SOB, nausea, vomiting, fevers, auditory or visual hallucinations, back pain, dysuria, homicidial ideations

## 2022-08-17 NOTE — PROVIDER CONTACT NOTE (CRITICAL VALUE NOTIFICATION) - NS PROVIDER READ BACK TO LAB
Lab Tests to get prior to next Visit  1. None     Changes in Medications  None     Referrals   1. None     Please follow up 6 months with our clinic. Please call if your symptoms worsen or fail to improve. yes

## 2022-09-13 NOTE — ED PROVIDER NOTE - ENMT, MLM
Airway patent, Nasal mucosa clear. Mouth with normal mucosa. Throat has no vesicles, no oropharyngeal exudates and uvula is midline. Scc Sarcomatoid Histology Text: There were aggregates of squamous cells in a sarcomatous pattern.

## 2022-10-28 NOTE — ED PROVIDER NOTE - PSYCHIATRIC MOOD
Splinted finger and educated patient along with family of proper dressing requirements      Adrian Moreno RN  10/27/22 [id number] ANXIOUS/DEPRESSED

## 2022-12-21 NOTE — PATIENT PROFILE ADULT. - REASON FOR REFUSAL (REFER PATIENT TO HEALTHCARE PROVIDER FOR FOLLOW-UP):
Asc Procedure Text (A): After obtaining clear surgical margins the patient was sent to an ASC for surgical repair.  The patient understands they will receive post-surgical care and follow-up from the ASC physician. never had a flu shot

## 2023-05-04 ENCOUNTER — APPOINTMENT (OUTPATIENT)
Dept: OBGYN | Facility: CLINIC | Age: 23
End: 2023-05-04
Payer: MEDICAID

## 2023-05-04 VITALS
SYSTOLIC BLOOD PRESSURE: 120 MMHG | WEIGHT: 156.6 LBS | HEIGHT: 63 IN | BODY MASS INDEX: 27.75 KG/M2 | DIASTOLIC BLOOD PRESSURE: 80 MMHG

## 2023-05-04 DIAGNOSIS — Z01.419 ENCOUNTER FOR GYNECOLOGICAL EXAMINATION (GENERAL) (ROUTINE) W/OUT ABNORMAL FINDINGS: ICD-10-CM

## 2023-05-04 PROCEDURE — 99395 PREV VISIT EST AGE 18-39: CPT

## 2023-05-04 NOTE — HISTORY OF PRESENT ILLNESS
[N] : Patient does not use contraception [Y] : Positive pregnancy history [Regular Cycle Intervals] : periods have been regular [Frequency: Q ___ days] : menstrual periods occur approximately every [unfilled] days [Menarche Age: ____] : age at menarche was [unfilled] [PGxTotal] : 1 [PGHxFullTerm] : 0 [PGHxPremature] : 0 [PGHxAbortions] : 1 [[facility]xLiving] : 0 [PGHxABInduced] : 0 [PGHxABSpont] : 1 [PGHxEctopic] : 0 [PGHxMultBirths] : 0

## 2023-05-09 LAB — CYTOLOGY CVX/VAG DOC THIN PREP: ABNORMAL

## 2023-05-12 DIAGNOSIS — Z34.91 ENCOUNTER FOR SUPERVISION OF NORMAL PREGNANCY, UNSPECIFIED, FIRST TRIMESTER: ICD-10-CM

## 2023-05-12 DIAGNOSIS — O02.81 INAPPROPRIATE CHANGE IN QUANTITATIVE HUMAN CHORIONIC GONADOTROPIN (HCG) IN EARLY PREGNANCY: ICD-10-CM

## 2023-05-12 DIAGNOSIS — Z87.42 PERSONAL HISTORY OF OTHER DISEASES OF THE FEMALE GENITAL TRACT: ICD-10-CM

## 2023-05-12 DIAGNOSIS — O02.1 MISSED ABORTION: ICD-10-CM

## 2023-08-04 ENCOUNTER — APPOINTMENT (OUTPATIENT)
Dept: OBGYN | Facility: CLINIC | Age: 23
End: 2023-08-04
Payer: MEDICAID

## 2023-08-04 VITALS
WEIGHT: 156 LBS | HEIGHT: 63 IN | BODY MASS INDEX: 27.64 KG/M2 | DIASTOLIC BLOOD PRESSURE: 78 MMHG | SYSTOLIC BLOOD PRESSURE: 118 MMHG

## 2023-08-04 DIAGNOSIS — B37.31 ACUTE CANDIDIASIS OF VULVA AND VAGINA: ICD-10-CM

## 2023-08-04 PROCEDURE — 99213 OFFICE O/P EST LOW 20 MIN: CPT

## 2023-08-04 RX ORDER — FLUCONAZOLE 150 MG/1
150 TABLET ORAL
Qty: 2 | Refills: 0 | Status: ACTIVE | COMMUNITY
Start: 2023-08-04 | End: 1900-01-01

## 2023-08-04 NOTE — PHYSICAL EXAM
[Chaperone Present] : A chaperone was present in the examining room during all aspects of the physical examination [Awake] : awake [Alert] : alert [Acute Distress] : no acute distress [Soft] : soft [Tender] : non tender [Oriented x3] : oriented to person, place, and time [Normal] : uterus [Discharge] : a  ~M vaginal discharge was present [Moderate] : moderate [No Bleeding] : there was no active vaginal bleeding [Uterine Adnexae] : were not tender and not enlarged

## 2023-10-13 ENCOUNTER — NON-APPOINTMENT (OUTPATIENT)
Age: 23
End: 2023-10-13

## 2023-10-19 ENCOUNTER — APPOINTMENT (OUTPATIENT)
Dept: OBGYN | Facility: CLINIC | Age: 23
End: 2023-10-19

## 2023-12-17 ENCOUNTER — NON-APPOINTMENT (OUTPATIENT)
Age: 23
End: 2023-12-17

## 2024-02-06 NOTE — ED ADULT NURSE NOTE - IS THE PATIENT ABLE TO BE SCREENED?
#7.5@25 at the lip ET tube. Patient is on AC/VC.  Suctioning Small amounts of clear secretions.   Yes

## 2024-02-27 ENCOUNTER — EMERGENCY (EMERGENCY)
Facility: HOSPITAL | Age: 24
LOS: 1 days | Discharge: HOME | End: 2024-02-27
Attending: EMERGENCY MEDICINE
Payer: COMMERCIAL

## 2024-02-27 VITALS
TEMPERATURE: 98 F | SYSTOLIC BLOOD PRESSURE: 120 MMHG | OXYGEN SATURATION: 99 % | WEIGHT: 154.32 LBS | RESPIRATION RATE: 18 BRPM | HEART RATE: 84 BPM | DIASTOLIC BLOOD PRESSURE: 87 MMHG

## 2024-02-27 DIAGNOSIS — Z98.890 OTHER SPECIFIED POSTPROCEDURAL STATES: Chronic | ICD-10-CM

## 2024-02-27 LAB
ALBUMIN SERPL ELPH-MCNC: 4.6 G/DL — SIGNIFICANT CHANGE UP (ref 3.3–5.2)
ALP SERPL-CCNC: 58 U/L — SIGNIFICANT CHANGE UP (ref 40–120)
ALT FLD-CCNC: 11 U/L — SIGNIFICANT CHANGE UP
ANION GAP SERPL CALC-SCNC: 14 MMOL/L — SIGNIFICANT CHANGE UP (ref 5–17)
APPEARANCE UR: CLEAR — SIGNIFICANT CHANGE UP
AST SERPL-CCNC: 14 U/L — SIGNIFICANT CHANGE UP
BASOPHILS # BLD AUTO: 0.04 K/UL — SIGNIFICANT CHANGE UP (ref 0–0.2)
BASOPHILS NFR BLD AUTO: 0.4 % — SIGNIFICANT CHANGE UP (ref 0–2)
BILIRUB SERPL-MCNC: 0.4 MG/DL — SIGNIFICANT CHANGE UP (ref 0.4–2)
BILIRUB UR-MCNC: NEGATIVE — SIGNIFICANT CHANGE UP
BUN SERPL-MCNC: 5.5 MG/DL — LOW (ref 8–20)
CALCIUM SERPL-MCNC: 9.4 MG/DL — SIGNIFICANT CHANGE UP (ref 8.4–10.5)
CHLORIDE SERPL-SCNC: 102 MMOL/L — SIGNIFICANT CHANGE UP (ref 96–108)
CO2 SERPL-SCNC: 22 MMOL/L — SIGNIFICANT CHANGE UP (ref 22–29)
COLOR SPEC: YELLOW — SIGNIFICANT CHANGE UP
CREAT SERPL-MCNC: 0.58 MG/DL — SIGNIFICANT CHANGE UP (ref 0.5–1.3)
DIFF PNL FLD: NEGATIVE — SIGNIFICANT CHANGE UP
EGFR: 130 ML/MIN/1.73M2 — SIGNIFICANT CHANGE UP
EOSINOPHIL # BLD AUTO: 0.23 K/UL — SIGNIFICANT CHANGE UP (ref 0–0.5)
EOSINOPHIL NFR BLD AUTO: 2.2 % — SIGNIFICANT CHANGE UP (ref 0–6)
GLUCOSE SERPL-MCNC: 88 MG/DL — SIGNIFICANT CHANGE UP (ref 70–99)
GLUCOSE UR QL: NEGATIVE MG/DL — SIGNIFICANT CHANGE UP
HCG SERPL-ACNC: <4 MIU/ML — SIGNIFICANT CHANGE UP
HCT VFR BLD CALC: 39.3 % — SIGNIFICANT CHANGE UP (ref 34.5–45)
HGB BLD-MCNC: 12.9 G/DL — SIGNIFICANT CHANGE UP (ref 11.5–15.5)
IMM GRANULOCYTES NFR BLD AUTO: 0.3 % — SIGNIFICANT CHANGE UP (ref 0–0.9)
KETONES UR-MCNC: NEGATIVE MG/DL — SIGNIFICANT CHANGE UP
LEUKOCYTE ESTERASE UR-ACNC: NEGATIVE — SIGNIFICANT CHANGE UP
LIDOCAIN IGE QN: 14 U/L — LOW (ref 22–51)
LYMPHOCYTES # BLD AUTO: 2.64 K/UL — SIGNIFICANT CHANGE UP (ref 1–3.3)
LYMPHOCYTES # BLD AUTO: 24.7 % — SIGNIFICANT CHANGE UP (ref 13–44)
MCHC RBC-ENTMCNC: 29.5 PG — SIGNIFICANT CHANGE UP (ref 27–34)
MCHC RBC-ENTMCNC: 32.8 GM/DL — SIGNIFICANT CHANGE UP (ref 32–36)
MCV RBC AUTO: 89.9 FL — SIGNIFICANT CHANGE UP (ref 80–100)
MONOCYTES # BLD AUTO: 0.64 K/UL — SIGNIFICANT CHANGE UP (ref 0–0.9)
MONOCYTES NFR BLD AUTO: 6 % — SIGNIFICANT CHANGE UP (ref 2–14)
NEUTROPHILS # BLD AUTO: 7.09 K/UL — SIGNIFICANT CHANGE UP (ref 1.8–7.4)
NEUTROPHILS NFR BLD AUTO: 66.4 % — SIGNIFICANT CHANGE UP (ref 43–77)
NITRITE UR-MCNC: NEGATIVE — SIGNIFICANT CHANGE UP
PH UR: 7 — SIGNIFICANT CHANGE UP (ref 5–8)
PLATELET # BLD AUTO: 305 K/UL — SIGNIFICANT CHANGE UP (ref 150–400)
POTASSIUM SERPL-MCNC: 4.2 MMOL/L — SIGNIFICANT CHANGE UP (ref 3.5–5.3)
POTASSIUM SERPL-SCNC: 4.2 MMOL/L — SIGNIFICANT CHANGE UP (ref 3.5–5.3)
PROT SERPL-MCNC: 7.4 G/DL — SIGNIFICANT CHANGE UP (ref 6.6–8.7)
PROT UR-MCNC: NEGATIVE MG/DL — SIGNIFICANT CHANGE UP
RBC # BLD: 4.37 M/UL — SIGNIFICANT CHANGE UP (ref 3.8–5.2)
RBC # FLD: 12.6 % — SIGNIFICANT CHANGE UP (ref 10.3–14.5)
SODIUM SERPL-SCNC: 138 MMOL/L — SIGNIFICANT CHANGE UP (ref 135–145)
SP GR SPEC: 1.01 — SIGNIFICANT CHANGE UP (ref 1–1.03)
UROBILINOGEN FLD QL: 0.2 MG/DL — SIGNIFICANT CHANGE UP (ref 0.2–1)
WBC # BLD: 10.67 K/UL — HIGH (ref 3.8–10.5)
WBC # FLD AUTO: 10.67 K/UL — HIGH (ref 3.8–10.5)

## 2024-02-27 PROCEDURE — 96374 THER/PROPH/DIAG INJ IV PUSH: CPT | Mod: XU

## 2024-02-27 PROCEDURE — 99285 EMERGENCY DEPT VISIT HI MDM: CPT

## 2024-02-27 PROCEDURE — 83690 ASSAY OF LIPASE: CPT

## 2024-02-27 PROCEDURE — 76856 US EXAM PELVIC COMPLETE: CPT

## 2024-02-27 PROCEDURE — 99284 EMERGENCY DEPT VISIT MOD MDM: CPT | Mod: 25

## 2024-02-27 PROCEDURE — 85025 COMPLETE CBC W/AUTO DIFF WBC: CPT

## 2024-02-27 PROCEDURE — 84702 CHORIONIC GONADOTROPIN TEST: CPT

## 2024-02-27 PROCEDURE — 96375 TX/PRO/DX INJ NEW DRUG ADDON: CPT

## 2024-02-27 PROCEDURE — 87086 URINE CULTURE/COLONY COUNT: CPT

## 2024-02-27 PROCEDURE — 74177 CT ABD & PELVIS W/CONTRAST: CPT | Mod: 26,MC

## 2024-02-27 PROCEDURE — 76830 TRANSVAGINAL US NON-OB: CPT | Mod: 26

## 2024-02-27 PROCEDURE — 74177 CT ABD & PELVIS W/CONTRAST: CPT | Mod: MC

## 2024-02-27 PROCEDURE — 76856 US EXAM PELVIC COMPLETE: CPT | Mod: 26

## 2024-02-27 PROCEDURE — 80053 COMPREHEN METABOLIC PANEL: CPT

## 2024-02-27 PROCEDURE — 36415 COLL VENOUS BLD VENIPUNCTURE: CPT

## 2024-02-27 PROCEDURE — 81003 URINALYSIS AUTO W/O SCOPE: CPT

## 2024-02-27 PROCEDURE — 76830 TRANSVAGINAL US NON-OB: CPT

## 2024-02-27 RX ORDER — MORPHINE SULFATE 50 MG/1
4 CAPSULE, EXTENDED RELEASE ORAL ONCE
Refills: 0 | Status: DISCONTINUED | OUTPATIENT
Start: 2024-02-27 | End: 2024-02-27

## 2024-02-27 RX ORDER — ACETAMINOPHEN 500 MG
1000 TABLET ORAL ONCE
Refills: 0 | Status: COMPLETED | OUTPATIENT
Start: 2024-02-27 | End: 2024-02-27

## 2024-02-27 RX ORDER — ONDANSETRON 8 MG/1
4 TABLET, FILM COATED ORAL ONCE
Refills: 0 | Status: COMPLETED | OUTPATIENT
Start: 2024-02-27 | End: 2024-02-27

## 2024-02-27 RX ORDER — ONDANSETRON 8 MG/1
4 TABLET, FILM COATED ORAL ONCE
Refills: 0 | Status: DISCONTINUED | OUTPATIENT
Start: 2024-02-27 | End: 2024-03-06

## 2024-02-27 RX ORDER — KETOROLAC TROMETHAMINE 30 MG/ML
30 SYRINGE (ML) INJECTION ONCE
Refills: 0 | Status: DISCONTINUED | OUTPATIENT
Start: 2024-02-27 | End: 2024-02-27

## 2024-02-27 RX ORDER — SODIUM CHLORIDE 9 MG/ML
1000 INJECTION INTRAMUSCULAR; INTRAVENOUS; SUBCUTANEOUS ONCE
Refills: 0 | Status: COMPLETED | OUTPATIENT
Start: 2024-02-27 | End: 2024-02-27

## 2024-02-27 RX ADMIN — ONDANSETRON 4 MILLIGRAM(S): 8 TABLET, FILM COATED ORAL at 17:20

## 2024-02-27 RX ADMIN — SODIUM CHLORIDE 1000 MILLILITER(S): 9 INJECTION INTRAMUSCULAR; INTRAVENOUS; SUBCUTANEOUS at 15:32

## 2024-02-27 RX ADMIN — Medication 400 MILLIGRAM(S): at 15:32

## 2024-02-27 RX ADMIN — MORPHINE SULFATE 4 MILLIGRAM(S): 50 CAPSULE, EXTENDED RELEASE ORAL at 19:18

## 2024-02-27 RX ADMIN — Medication 30 MILLIGRAM(S): at 17:00

## 2024-02-27 RX ADMIN — ONDANSETRON 4 MILLIGRAM(S): 8 TABLET, FILM COATED ORAL at 15:32

## 2024-02-27 NOTE — ED STATDOCS - ATTENDING APP SHARED VISIT CONTRIBUTION OF CARE
I, Spencer Read, performed the initial face to face bedside interview with this patient regarding history of present illness, review of symptoms and relevant past medical, social and family history.  I completed an independent physical examination.  I was the initial provider who evaluated this patient. I have signed out the follow up of any pending tests (i.e. labs, radiological studies) to the [facility].  I have communicated the patient’s plan of care and disposition with the [facility].

## 2024-02-27 NOTE — ED STATDOCS - OBJECTIVE STATEMENT
23-year-old female with history of ovarian cyst presents with lower abdominal pain that started this morning.  Patient report pain initially intermittent now constant.  Pain worse in the right lower pelvic region.  Symptoms associated with nausea.  No dysuria, no fever.

## 2024-02-27 NOTE — ED STATDOCS - NS ED ROS FT
CONSTITUTIONAL - no  fever, no diaphoresis, no weight change  SKIN - no rash  HEMATOLOGIC - no bleeding, no bruising  EYES - no eye pain, no blurred vision  ENT - no change in hearing, no pain  RESPIRATORY - no shortness of breath, no cough  CARDIAC - no chest pain, no palpitations  GI - (+) abd pain, (+) nausea, no vomiting, no diarrhea, no constipation, no bleeding  GENITO-URINARY - no discharge, no dysuria; no hematuria,   ENDO - no polydipsia, no polyuria, no heat/no cold intolerance  MUSCULOSKELETAL - no joint pain, no swelling, no redness  NEUROLOGIC - no weakness, no headache, no anesthesia, no paresthesias  PSYCH - no anxiety, non suicidal, non homicidal, no hallucination, no depression

## 2024-02-27 NOTE — ED ADULT TRIAGE NOTE - CHIEF COMPLAINT QUOTE
C/O RLQ abd pain that began this morning described as cramping. Denies N/v/D. PT currently menstruating but states pain is worse than usual. Hx of ovarian cycts.

## 2024-02-27 NOTE — ED STATDOCS - CLINICAL SUMMARY MEDICAL DECISION MAKING FREE TEXT BOX
23-year-old female with history of ovarian cyst presents with lower abdominal pain that started this morning.  Patient report pain initially intermittent now constant.  Pain worse in the right lower pelvic region.  Symptoms associated with nausea.  No dysuria, no fever.  mild right lower pelvic tenderness.  Will get blood work, urine, pelvic ultrasound to assess for ovarian pathology.  Will give IV fluid hydration, Zofran for nausea, IV Tylenol for pain. 23-year-old female with history of ovarian cyst presents with lower abdominal pain that started this morning.  Patient report pain initially intermittent now constant.  Pain worse in the right lower pelvic region.  Symptoms associated with nausea.  No dysuria, no fever.  mild right lower pelvic tenderness.  Will get blood work, urine, pelvic ultrasound to assess for ovarian pathology.  Will give IV fluid hydration, Zofran for nausea, IV Tylenol for pain.      Unremarkable pelvic sonogram. "1.6 cm corpus luteum cyst in the right ovary."    Pt reassessed, pt feeling better at this time, vss, pt able to walk, talk and vocalized plan of action. Discussed in depth and explained to pt in depth the next steps that need to be taken including proper follow up with PCP or specialists. All incidental findings were discussed with pt as well. Pt verbalized their concerns and all questions were answered. Pt understands dispo and wants discharge. Given good instructions when to return to ED, strict return precautions and importance of f/u.

## 2024-02-27 NOTE — ED STATDOCS - PATIENT PORTAL LINK FT
You can access the FollowMyHealth Patient Portal offered by [facility] by registering at the following website: [email or web address]. By joining [employer]’s FollowMyHealth portal, you will also be able to view your health information using other applications (apps) compatible with our system.

## 2024-02-27 NOTE — ED ADULT NURSE NOTE - OBJECTIVE STATEMENT
Pt is here with c/o RLQ cramp that started this morning with nausea.  Pt is currently menstruating but states the pain is worse than usual.  P/s hx of ovarian cysts. IV inserted to LAC, pt medicated as ordered.  IVF infusing without s/s redness or swelling noted.

## 2024-02-27 NOTE — ED STATDOCS - PHYSICAL EXAMINATION
VITAL SIGNS: I have reviewed nursing notes and confirm.  CONSTITUTIONAL:  in no acute distress.  SKIN: Skin exam is warm and dry, no acute rash.  HEAD: Normocephalic; atraumatic.  EYES: PERRL, EOM intact; conjunctiva and sclera clear.  ENT: No nasal discharge; airway clear. Throat clear.  NECK: Supple; non tender.    CARD: Regular rate and rhythm.  RESP: No wheezes,  no rales or rhonchi.   ABD:  soft; non-distended; (+) right pelvic pain   EXT: Normal ROM. No clubbing, cyanosis or edema.  NEURO: Alert, oriented. Grossly unremarkable. No focal deficits.  moves all extremities,  normal gait   PSYCH: Cooperative, appropriate.

## 2024-02-28 LAB
CULTURE RESULTS: SIGNIFICANT CHANGE UP
SPECIMEN SOURCE: SIGNIFICANT CHANGE UP

## 2024-03-12 NOTE — ED ADULT TRIAGE NOTE - PRO INTERPRETER NEED 2
The patient's goals for the shift include      The clinical goals for the shift include Pt will remain asymptomatic of any cardiac symptoms throughout the shift       English

## 2024-05-13 NOTE — ED ADULT NURSE NOTE - NSSISCREENINGQ5_ED_A_ED
"Dr. Tin Griffin. I would like to verify with you if the \"degenerative changes\" in the chest xray report is accurate considering her age.  Thank you for your time!  " No

## 2024-09-12 NOTE — ED PROVIDER NOTE - NS ED ATTENDING STATEMENT MOD
Detail Level: Detailed I have personally performed a face to face diagnostic evaluation on this patient. I have reviewed the ACP note and agree with the history, exam and plan of care, except as noted.